# Patient Record
Sex: MALE | Race: WHITE | URBAN - METROPOLITAN AREA
[De-identification: names, ages, dates, MRNs, and addresses within clinical notes are randomized per-mention and may not be internally consistent; named-entity substitution may affect disease eponyms.]

---

## 2022-03-17 ENCOUNTER — HISTORICAL (OUTPATIENT)
Dept: ADMINISTRATIVE | Facility: HOSPITAL | Age: 22
End: 2022-03-17

## 2024-06-08 ENCOUNTER — HOSPITAL ENCOUNTER (EMERGENCY)
Facility: HOSPITAL | Age: 24
Discharge: HOME | End: 2024-06-08
Attending: EMERGENCY MEDICINE
Payer: COMMERCIAL

## 2024-06-08 VITALS
TEMPERATURE: 97 F | OXYGEN SATURATION: 97 % | RESPIRATION RATE: 16 BRPM | DIASTOLIC BLOOD PRESSURE: 91 MMHG | BODY MASS INDEX: 24.25 KG/M2 | HEART RATE: 80 BPM | SYSTOLIC BLOOD PRESSURE: 143 MMHG | WEIGHT: 160 LBS | HEIGHT: 68 IN

## 2024-06-08 DIAGNOSIS — S60.459A FOREIGN BODY IN SKIN OF FINGER, INITIAL ENCOUNTER: Primary | ICD-10-CM

## 2024-06-08 PROCEDURE — 99282 EMERGENCY DEPT VISIT SF MDM: CPT

## 2024-06-08 RX ORDER — LIDOCAINE HYDROCHLORIDE 10 MG/ML
5 INJECTION, SOLUTION EPIDURAL; INFILTRATION; INTRACAUDAL; PERINEURAL
Status: DISCONTINUED | OUTPATIENT
Start: 2024-06-08 | End: 2024-06-08 | Stop reason: HOSPADM

## 2024-06-08 NOTE — ED PROVIDER NOTES
"Encounter Date: 6/8/2024       History     Chief Complaint   Patient presents with    Hand Pain     Sent from urgent care due to him having a piece of a noodle stuck in his finger. States he was washing dishes last night and attempted to scrape a dried noodle with his finger and it is now stuck and possibly swelling due to moisture.     The patient presents to the ED c/o of "noodle under nail bed". The patient states that he was cleaning pot on last night and states that he went to scrape with right second finger and a piece of noodle went under nail bed. The patient states that he did present to urgent care but states that they referred him to ED due to inability to remove nail bed.         Review of patient's allergies indicates:  No Known Allergies  No past medical history on file.  No past surgical history on file.  No family history on file.     Review of Systems   Constitutional: Negative.    Respiratory: Negative.     Cardiovascular: Negative.    Gastrointestinal: Negative.    Skin:         Noodle under right 2nd finger nail bed   Neurological: Negative.        Physical Exam     Initial Vitals [06/08/24 1219]   BP Pulse Resp Temp SpO2   (!) 143/91 80 16 97.2 °F (36.2 °C) 97 %      MAP       --         Physical Exam    Nursing note and vitals reviewed.  Constitutional: He appears well-developed and well-nourished.   HENT:   Head: Normocephalic and atraumatic.   Right Ear: External ear normal.   Left Ear: External ear normal.   Eyes: Conjunctivae are normal. Pupils are equal, round, and reactive to light.   Cardiovascular:  Normal rate and regular rhythm.           Pulmonary/Chest: No respiratory distress.   Abdominal: Abdomen is soft. Bowel sounds are normal.     Neurological: He is alert and oriented to person, place, and time.   Skin: Skin is warm.   Foreign body noted under right 2nd finger nail bed. Skin to proximal right second finger does appear to be broken w/o drainage or TTP         ED Course " "  Procedures  Labs Reviewed - No data to display       Imaging Results    None          Medications   LIDOcaine (PF) 10 mg/ml (1%) injection 50 mg (50 mg Infiltration Not Given 6/8/24 5711)     Medical Decision Making  The patient presents to the ED c/o of "noodle under nail bed". The patient states that he was cleaning pot on last night and states that he went to scrape with right second finger and a piece of noodle went under nail bed. The patient states that he did present to urgent care but states that they referred him to ED due to inability to remove nail bed.   Patient counseled on nail removal to right second finger to remove foreign body. The patient states that he was concerned about medical bill for procedure. Patient educated that if procedure is not completed that right second finger can become infected. The patient does admit that he is ok with waiting. The patient further educated on the risk of infection if procedure refused. Patient refused. The patient educated on prompt return to ED with any increasing TTP, drainage, or fevers at home.    Problems Addressed:  Foreign body in skin of finger, initial encounter: acute illness or injury     Details: Patient counseled on nail removal to right second finger to remove foreign body. The patient states that he was concerned about medical bill for procedure. Patient educated that if procedure is not completed that right second finger can become infected. The patient does admit that he is ok with waiting. The patient further educated on the risk of infection if procedure refused. Patient refused. The patient educated on prompt return to ED with any increasing TTP, drainage, or fevers at home.                                        Clinical Impression:  Final diagnoses:  [F89.812K] Foreign body in skin of finger, initial encounter (Primary)          ED Disposition Condition    Discharge Stable          ED Prescriptions    None       Follow-up Information  "      Follow up With Specialties Details Why Contact Info    Your PCP  Go in 2 days  follow up monday morning             Hans Oconnor NP  06/08/24 6809

## 2024-06-08 NOTE — DISCHARGE INSTRUCTIONS
The patient educated to watch for signs of infections such as increasing redness to site, drainage to site, tenderness to site and fevers. Present back to ED with any of these symptoms.

## 2024-06-09 ENCOUNTER — HOSPITAL ENCOUNTER (EMERGENCY)
Facility: HOSPITAL | Age: 24
Discharge: HOME | End: 2024-06-09
Attending: EMERGENCY MEDICINE
Payer: COMMERCIAL

## 2024-06-09 VITALS
SYSTOLIC BLOOD PRESSURE: 128 MMHG | HEART RATE: 79 BPM | TEMPERATURE: 98 F | HEIGHT: 68 IN | RESPIRATION RATE: 16 BRPM | DIASTOLIC BLOOD PRESSURE: 79 MMHG | BODY MASS INDEX: 24.25 KG/M2 | OXYGEN SATURATION: 98 % | WEIGHT: 160 LBS

## 2024-06-09 DIAGNOSIS — L03.011 ABSCESS AROUND FINGERNAIL OF RIGHT HAND: ICD-10-CM

## 2024-06-09 DIAGNOSIS — M79.5 FOREIGN BODY (FB) IN SOFT TISSUE: Primary | ICD-10-CM

## 2024-06-09 PROCEDURE — 10120 INC&RMVL FB SUBQ TISS SMPL: CPT

## 2024-06-09 PROCEDURE — 25000003 PHARM REV CODE 250

## 2024-06-09 PROCEDURE — 99283 EMERGENCY DEPT VISIT LOW MDM: CPT | Mod: 25

## 2024-06-09 RX ORDER — LIDOCAINE HYDROCHLORIDE 10 MG/ML
5 INJECTION, SOLUTION EPIDURAL; INFILTRATION; INTRACAUDAL; PERINEURAL
Status: COMPLETED | OUTPATIENT
Start: 2024-06-09 | End: 2024-06-09

## 2024-06-09 RX ORDER — CLINDAMYCIN HYDROCHLORIDE 300 MG/1
300 CAPSULE ORAL EVERY 8 HOURS
Qty: 21 CAPSULE | Refills: 0 | Status: SHIPPED | OUTPATIENT
Start: 2024-06-09 | End: 2024-06-16

## 2024-06-09 RX ADMIN — LIDOCAINE HYDROCHLORIDE 50 MG: 10 SOLUTION INTRAVENOUS at 02:06

## 2024-06-09 NOTE — ED PROVIDER NOTES
Encounter Date: 6/9/2024       History     Chief Complaint   Patient presents with    Hand Pain     Seen here yesterday due to foreign object (noodle) stuck in his R index finger and is back today because he has decided he does want it removed.     The patient presents to the ED c/o right finger pain. The patient presented to ED on yesterday with same complaints and states that he was cleaning pot and scraped noodle at bottom of pot with right second finger. The patient stated that he would like to wait on foreign body removal. The patient was d/c home. The patient presented back to ED on today c/o worsening of right finger pain with redness and pain.       Review of patient's allergies indicates:  No Known Allergies  No past medical history on file.  No past surgical history on file.  No family history on file.     Review of Systems   Constitutional: Negative.    Respiratory: Negative.     Cardiovascular: Negative.    Gastrointestinal: Negative.    Skin:         Right finger pain and redness       Physical Exam     Initial Vitals [06/09/24 1351]   BP Pulse Resp Temp SpO2   132/80 80 14 97.1 °F (36.2 °C) 97 %      MAP       --         Physical Exam    Nursing note and vitals reviewed.  Constitutional: He appears well-developed.   HENT:   Head: Normocephalic.   Eyes: Conjunctivae and EOM are normal. Pupils are equal, round, and reactive to light.   Neck:   Normal range of motion.  Cardiovascular:  Normal rate.           Pulmonary/Chest: No respiratory distress.   Abdominal: Abdomen is soft. Bowel sounds are normal.   Musculoskeletal:      Cervical back: Normal range of motion.     Skin: Skin is warm and dry.   Right second finger with mild redness and TTP to proximal finger. Foreign body visualized under nail bed.         ED Course   Procedures  Labs Reviewed - No data to display       Imaging Results    None          Medications   LIDOcaine (PF) 10 mg/ml (1%) injection 50 mg (50 mg Infiltration Given by Other 6/9/24  1424)     Medical Decision Making  The patient presents to the ED c/o right finger pain. The patient presented to ED on yesterday with same complaints and states that he was cleaning pot and scraped noodle at bottom of pot with right second finger. The patient stated that he would like to wait on foreign body removal. The patient was d/c home. The patient presented back to ED on today c/o worsening of right finger pain with redness and pain.     Problems Addressed:  Foreign body (FB) in soft tissue: acute illness or injury     Details: Patient assessed and foreign body removal performed. Foreign body removed and nail bed irrigated. Patient tolerated procedure well. Patient educated on antibiotic treatment. The patient does admit to tetanus UTD. Patient states that he does get yearly tetanus for job. The patient educated to f/u with PCP in 2 days. The patient educated to present to ED with any worsening of condition.     Risk  Prescription drug management.               ED Course as of 06/09/24 1458   Sun Jun 09, 2024   1449 Right finger foreign body removal. 1% lidocaine used locally. Surgical scissors used to perform linear cut to midline of nail bed to right second finger. Upon cutting, purulent drainage noted. Attempted to remove entire nail bed, unable to. Nail bed cut in half and visualized. Foreign body noted and removed. Nailbed irrigated with 20ccs NS. The patient tolerated procedure. gauze dressing applied [DL]      ED Course User Index  [DL] Hans Oconnor NP                           Clinical Impression:  Final diagnoses:  [M79.5] Foreign body (FB) in soft tissue (Primary)  [L03.011] Abscess around fingernail of right hand          ED Disposition Condition    Discharge Stable          ED Prescriptions       Medication Sig Dispense Start Date End Date Auth. Provider    clindamycin (CLEOCIN) 300 MG capsule Take 1 capsule (300 mg total) by mouth every 8 (eight) hours. for 7 days 21 capsule 6/9/2024  6/16/2024 Hans Oconnor NP          Follow-up Information       Follow up With Specialties Details Why Contact Info    PCP  In 2 days               Hans Oconnor NP  06/09/24 7119

## 2024-06-09 NOTE — Clinical Note
"Marco Vicente" Jah was seen and treated in our emergency department on 6/9/2024.  He may return to work on 06/11/2024.       If you have any questions or concerns, please don't hesitate to call.      Hans Oconnor, NP"

## 2024-06-09 NOTE — DISCHARGE INSTRUCTIONS
The patient educated to f/u with PCP in 2 days. The patient educated to present to ED with any worsening of condition. Please take medications as prescribed. Please clean wound bed with mild soap and water daily. Please clean keep wound clean and sry.